# Patient Record
Sex: FEMALE | Race: WHITE | NOT HISPANIC OR LATINO | Employment: OTHER | URBAN - METROPOLITAN AREA
[De-identification: names, ages, dates, MRNs, and addresses within clinical notes are randomized per-mention and may not be internally consistent; named-entity substitution may affect disease eponyms.]

---

## 2017-11-01 ENCOUNTER — HOSPITAL ENCOUNTER (OUTPATIENT)
Facility: MEDICAL CENTER | Age: 47
End: 2017-11-01
Attending: SPECIALIST | Admitting: SPECIALIST
Payer: COMMERCIAL

## 2017-11-01 VITALS
WEIGHT: 126.98 LBS | HEIGHT: 65 IN | RESPIRATION RATE: 16 BRPM | BODY MASS INDEX: 21.16 KG/M2 | DIASTOLIC BLOOD PRESSURE: 68 MMHG | TEMPERATURE: 99 F | SYSTOLIC BLOOD PRESSURE: 123 MMHG | HEART RATE: 58 BPM | OXYGEN SATURATION: 98 %

## 2017-11-01 LAB
B-HCG FREE SERPL-ACNC: <5 MIU/ML
IHCGL IHCGL: NEGATIVE MIU/ML

## 2017-11-01 PROCEDURE — 500126 HCHG BOVIE, NEEDLE TIP: Performed by: SPECIALIST

## 2017-11-01 PROCEDURE — 160028 HCHG SURGERY MINUTES - 1ST 30 MINS LEVEL 3: Performed by: SPECIALIST

## 2017-11-01 PROCEDURE — 501838 HCHG SUTURE GENERAL: Performed by: SPECIALIST

## 2017-11-01 PROCEDURE — 700101 HCHG RX REV CODE 250

## 2017-11-01 PROCEDURE — 700102 HCHG RX REV CODE 250 W/ 637 OVERRIDE(OP)

## 2017-11-01 PROCEDURE — 160009 HCHG ANES TIME/MIN: Performed by: SPECIALIST

## 2017-11-01 PROCEDURE — 160046 HCHG PACU - 1ST 60 MINS PHASE II: Performed by: SPECIALIST

## 2017-11-01 PROCEDURE — 160036 HCHG PACU - EA ADDL 30 MINS PHASE I: Performed by: SPECIALIST

## 2017-11-01 PROCEDURE — 84702 CHORIONIC GONADOTROPIN TEST: CPT

## 2017-11-01 PROCEDURE — 500389 HCHG DRAIN, RESERVOIR SUCT JP 100CC: Performed by: SPECIALIST

## 2017-11-01 PROCEDURE — A9270 NON-COVERED ITEM OR SERVICE: HCPCS

## 2017-11-01 PROCEDURE — 160047 HCHG PACU  - EA ADDL 30 MINS PHASE II: Performed by: SPECIALIST

## 2017-11-01 PROCEDURE — 700111 HCHG RX REV CODE 636 W/ 250 OVERRIDE (IP)

## 2017-11-01 PROCEDURE — 500368 HCHG DRAIN, 7MM FLAT-FLUTED: Performed by: SPECIALIST

## 2017-11-01 PROCEDURE — 500754 HCHG JAW BRA: Performed by: SPECIALIST

## 2017-11-01 PROCEDURE — 160035 HCHG PACU - 1ST 60 MINS PHASE I: Performed by: SPECIALIST

## 2017-11-01 PROCEDURE — A6454 SELF-ADHER BAND W>=3" <5"/YD: HCPCS | Performed by: SPECIALIST

## 2017-11-01 PROCEDURE — 501445 HCHG STAPLER, SKIN DISP: Performed by: SPECIALIST

## 2017-11-01 PROCEDURE — 500440 HCHG DRESSING, STERILE ROLL (KERLIX): Performed by: SPECIALIST

## 2017-11-01 PROCEDURE — 160025 RECOVERY II MINUTES (STATS): Performed by: SPECIALIST

## 2017-11-01 PROCEDURE — 160002 HCHG RECOVERY MINUTES (STAT): Performed by: SPECIALIST

## 2017-11-01 PROCEDURE — 160039 HCHG SURGERY MINUTES - EA ADDL 1 MIN LEVEL 3: Performed by: SPECIALIST

## 2017-11-01 PROCEDURE — 500043 HCHG BAG-A-JET: Performed by: SPECIALIST

## 2017-11-01 PROCEDURE — 501480 HCHG STOCKINETTE: Performed by: SPECIALIST

## 2017-11-01 PROCEDURE — 160048 HCHG OR STATISTICAL LEVEL 1-5: Performed by: SPECIALIST

## 2017-11-01 PROCEDURE — 700105 HCHG RX REV CODE 258: Performed by: SPECIALIST

## 2017-11-01 RX ORDER — BACITRACIN ZINC 500 [USP'U]/G
OINTMENT TOPICAL
Status: DISCONTINUED | OUTPATIENT
Start: 2017-11-01 | End: 2017-11-01 | Stop reason: HOSPADM

## 2017-11-01 RX ORDER — LIDOCAINE HYDROCHLORIDE 10 MG/ML
INJECTION, SOLUTION INFILTRATION; PERINEURAL
Status: COMPLETED
Start: 2017-11-01 | End: 2017-11-01

## 2017-11-01 RX ORDER — SODIUM CHLORIDE, SODIUM LACTATE, POTASSIUM CHLORIDE, CALCIUM CHLORIDE 600; 310; 30; 20 MG/100ML; MG/100ML; MG/100ML; MG/100ML
INJECTION, SOLUTION INTRAVENOUS
Status: DISPENSED | OUTPATIENT
Start: 2017-11-01 | End: 2017-11-01

## 2017-11-01 RX ORDER — ACETAMINOPHEN 500 MG
TABLET ORAL
Status: COMPLETED
Start: 2017-11-01 | End: 2017-11-01

## 2017-11-01 RX ORDER — OXYCODONE HCL 5 MG/5 ML
SOLUTION, ORAL ORAL
Status: COMPLETED
Start: 2017-11-01 | End: 2017-11-01

## 2017-11-01 RX ORDER — LIDOCAINE HYDROCHLORIDE AND EPINEPHRINE 5; 5 MG/ML; UG/ML
INJECTION, SOLUTION INFILTRATION; PERINEURAL
Status: DISCONTINUED | OUTPATIENT
Start: 2017-11-01 | End: 2017-11-01 | Stop reason: HOSPADM

## 2017-11-01 RX ORDER — SCOLOPAMINE TRANSDERMAL SYSTEM 1 MG/1
PATCH, EXTENDED RELEASE TRANSDERMAL
Status: COMPLETED
Start: 2017-11-01 | End: 2017-11-01

## 2017-11-01 RX ORDER — CELECOXIB 200 MG/1
CAPSULE ORAL
Status: COMPLETED
Start: 2017-11-01 | End: 2017-11-01

## 2017-11-01 RX ORDER — SODIUM CHLORIDE, SODIUM LACTATE, POTASSIUM CHLORIDE, CALCIUM CHLORIDE 600; 310; 30; 20 MG/100ML; MG/100ML; MG/100ML; MG/100ML
INJECTION, SOLUTION INTRAVENOUS CONTINUOUS
Status: DISCONTINUED | OUTPATIENT
Start: 2017-11-01 | End: 2017-11-01 | Stop reason: HOSPADM

## 2017-11-01 RX ORDER — LEVOTHYROXINE SODIUM 0.12 MG/1
125 TABLET ORAL
COMMUNITY

## 2017-11-01 RX ADMIN — FENTANYL CITRATE 25 MCG: 50 INJECTION, SOLUTION INTRAMUSCULAR; INTRAVENOUS at 15:34

## 2017-11-01 RX ADMIN — LIDOCAINE HYDROCHLORIDE 0.2 ML: 10 INJECTION, SOLUTION INFILTRATION; PERINEURAL at 09:45

## 2017-11-01 RX ADMIN — OXYCODONE HYDROCHLORIDE 10 MG: 5 SOLUTION ORAL at 15:10

## 2017-11-01 RX ADMIN — FENTANYL CITRATE 50 MCG: 50 INJECTION, SOLUTION INTRAMUSCULAR; INTRAVENOUS at 15:57

## 2017-11-01 RX ADMIN — ACETAMINOPHEN 1000 MG: 500 TABLET, COATED ORAL at 10:06

## 2017-11-01 RX ADMIN — CELECOXIB 200 MG: 200 CAPSULE ORAL at 10:06

## 2017-11-01 RX ADMIN — SCOPALAMINE 1 PATCH: 1 PATCH, EXTENDED RELEASE TRANSDERMAL at 09:45

## 2017-11-01 RX ADMIN — SODIUM CHLORIDE, POTASSIUM CHLORIDE, SODIUM LACTATE AND CALCIUM CHLORIDE 1000 ML: 600; 310; 30; 20 INJECTION, SOLUTION INTRAVENOUS at 10:05

## 2017-11-01 ASSESSMENT — PAIN SCALES - GENERAL
PAINLEVEL_OUTOF10: 6
PAINLEVEL_OUTOF10: 4
PAINLEVEL_OUTOF10: 3
PAINLEVEL_OUTOF10: ASSUMED PAIN PRESENT
PAINLEVEL_OUTOF10: 4
PAINLEVEL_OUTOF10: 4
PAINLEVEL_OUTOF10: 0
PAINLEVEL_OUTOF10: 7
PAINLEVEL_OUTOF10: ASSUMED PAIN PRESENT
PAINLEVEL_OUTOF10: 5
PAINLEVEL_OUTOF10: 4
PAINLEVEL_OUTOF10: 3

## 2017-11-01 NOTE — OR NURSING
1450- Pt to pacu via gurney with side rails up.  VSS. OPA in place.  Dressing around face (ace wrap) cdi with jaw braw with ice in place.  ESTRELLA to right and left side of face to self suction with small amount serosang drainage. HOB elevated 30 degrees.  1505- VSS. Pt c/o mild pain, see med given. Ice pack over eyes. Denies nausea.  1520- VSS. Pt resting quietly.  1535-VSS.  Pt rates pain now at 6/10, see mar for med given. Pt's friend updated via phone, not able to be here to  pt until 1645.  1550- Pt c/o headache, 7/10. See mar.  1605- Pain now tolerable  1620- sanguinous drainage coming through dressing   near L ESTRELLA drain insertion site.  Reinforced with ABD pad.  1640-  Report to April RN. Pt meets stage 2 criteria.

## 2017-11-01 NOTE — DISCHARGE INSTRUCTIONS
ACTIVITY: Rest and take it easy for the first 24 hours.  A responsible adult is recommended to remain with you during that time.  It is normal to feel sleepy.  We encourage you to not do anything that requires balance, judgment or coordination.    MILD FLU-LIKE SYMPTOMS ARE NORMAL. YOU MAY EXPERIENCE GENERALIZED MUSCLE ACHES, THROAT IRRITATION, HEADACHE AND/OR SOME NAUSEA.    FOR 24 HOURS DO NOT:  Drive, operate machinery or run household appliances.  Drink beer or alcoholic beverages.   Make important decisions or sign legal documents.    SPECIAL INSTRUCTIONS:  Keep Head of bed elevated 30 degrees when resting/sleeping  Incentive spirometer at home 10x/hr while awake  Ice compresses on / off to upper cheeks x 48 hours   Jaw bra with ice x 72 hours   Ice NS compresses eyelids on/off x 48 hours   Refresh eye drops as needed while awake   Lacrilube as needed while asleep   Check and milk ESTRELLA drains every 4 hours; empty ESTRELLA drains every 12 hours or when half full  Remove scopolamine patch from back of neck after 72 hours; then immediately wash skin then hands with soap and water    DIET: To avoid nausea, slowly advance diet as tolerated, avoiding spicy or greasy foods for the first day.  Add more substantial food to your diet according to your physician's instructions. INCREASE FLUIDS AND FIBER TO AVOID CONSTIPATION.    SURGICAL DRESSING/BATHING: Do not remove dressing, keep dressing clean and dry.    FOLLOW-UP APPOINTMENT:  A follow-up appointment should be arranged with your doctor; call to schedule.    You should CALL YOUR PHYSICIAN if you develop:  Fever greater than 101 degrees F.  Pain not relieved by medication, or persistent nausea or vomiting.  Excessive bleeding (blood soaking through dressing) or unexpected drainage from the wound.  Extreme redness or swelling around the incision site, drainage of pus or foul smelling drainage.  Inability to urinate or empty your bladder within 8 hours-return to Emergency  Room   Problems with breathing or chest pain- call 911    You should call 911 if you develop problems with breathing or chest pain.  If you are unable to contact your doctor or surgical center, you should go to the nearest emergency room or urgent care center.  Physician's telephone #: Dr Rachel [phone]    If any questions arise, call your doctor.  If your doctor is not available, please feel free to call the Surgical Center at [phone].  The Center is open Monday through Friday from 7AM to 7PM.  You can also call the [facility] HOTLINE open 24 hours/day, 7 days/week and speak to a nurse at [phone], or toll free at [phone].    A registered nurse may call you a few days after your surgery to see how you are doing after your procedure.    MEDICATIONS: Resume taking daily medication.  Take prescribed pain medication with food.  If no medication is prescribed, you may take non-aspirin pain medication if needed.  PAIN MEDICATION CAN BE VERY CONSTIPATING.  Take a stool softener or laxative such as senokot, pericolace, or milk of magnesia if needed.    Prescription given for Norco.  Last pain medication given at 3:10pm oxycodone 10mg.  Start antibiotic today at 6:20pm.    If your physician has prescribed pain medication that includes Acetaminophen (Tylenol), do not take additional Acetaminophen (Tylenol) while taking the prescribed medication.    Depression / Suicide Risk    As you are discharged from this [facility] facility, it is important to learn how to keep safe from harming yourself.    Recognize the warning signs:  · Abrupt changes in personality, positive or negative- including increase in energy   · Giving away possessions  · Change in eating patterns- significant weight changes-  positive or negative  · Change in sleeping patterns- unable to sleep or sleeping all the time   · Unwillingness or inability to communicate  · Depression  · Unusual sadness, discouragement and loneliness  · Talk of  wanting to die  · Neglect of personal appearance   · Rebelliousness- reckless behavior  · Withdrawal from people/activities they love  · Confusion- inability to concentrate     If you or a loved one observes any of these behaviors or has concerns about self-harm, here's what you can do:  · Talk about it- your feelings and reasons for harming yourself  · Remove any means that you might use to hurt yourself (examples: pills, rope, extension cords, firearm)  · Get professional help from the community (Mental Health, Substance Abuse, psychological counseling)  · Do not be alone:Call your Safe Contact- someone whom you trust who will be there for you.  · Call your local CRISIS HOTLINE 474-2751 or 285-468-8417  · Call your local Children's Mobile Crisis Response Team [place] (164) 868-2418 or www.Need  · Call the toll free National Suicide Prevention Hotlines   · National Suicide Prevention Lifeline 623-512-RGJO (3110)  · National Hope Line Network 800-SUICIDE (362-7949)

## 2017-11-01 NOTE — OR SURGEON
Immediate Post OP Note    PreOp Diagnosis: Aging face and neck with platysmal bands and bilateral brow ptosis with asymmetry    PostOp Diagnosis: same    Procedure(s):  RHYTIDECTOMY- REFRESHLIFT  PLATYSMALPLASTY  BROW LIFT- ENDOSCOPIC    Surgeon(s):  Manuel Rachel M.D.    Anesthesiologist/Type of Anesthesia:  Anesthesiologist: Frank Dao M.D./General    Surgical Staff:  Circulator: Amy Conner R.N.  Scrub Person: James Desouza    Specimens:  * No specimens in log *    Estimated Blood Loss: 100 cc    Findings: See dictation    Complications: None        11/1/2017 2:31 PM Manuel Rachel

## 2017-11-01 NOTE — OR NURSING
1641 patient to stage 2  Patient settled in recliner chair post short ambulation from lay - pt dressed with assist by RN. Dressing to head CDI with moderate serosanguinous drainage to bilateral head ESTRELLA drains. Refreshed ice packs now for jaw bra. Pt reports pain as tolerable, denies nausea and tolerating sips of water.   1700 Pt sitting up in recliner; resting quietly waiting for friend to arrive for discharge. Denies any needs.   1735 Instructed to use IS x 10 every hour while awake; demonstrated understanding. IS max 2300; reaches goal. Pain rated as tolerable and denies nausea.   1745 Lolly from Dr Rachel office here and reports that she knows care of patient post op for instructions; RN reviewed pain medication and antibiotic dose times due with specific instructions on discharge instructions; she states verbal understanding.

## 2017-11-10 NOTE — OP REPORT
DATE OF SERVICE:  11/01/2017    SERVICE:  Plastic surgery.    SURGEON:  Kiet Rachel MD    ANESTHESIOLOGIST:  Frank Dao MD    ANESTHESIA:  General.    PREOPERATIVE DIAGNOSES:  Aging face and neck with platysmal bands, and   bilateral brow ptosis with asymmetry.    POSTOPERATIVE DIAGNOSES:  Aging face and neck with platysmal bands, and   bilateral brow ptosis with asymmetry.    OPERATIVE PROCEDURE:  RefreshLift with platysmaplasty and bilateral   endoscopic brow lifts.    INDICATIONS:  A 46-year-old female who has had a modified facelift in the past   by Dr. Guy as well as fat injections to lower eyelids, is unhappy with   the appearance of her face, neck and brows.  The patient has left worse than   right significant brow ptosis as well as aging face and neck with platysmal   bands and asymmetry of the face where the left oral commissure is slightly   higher than the right and the chin is tilted to the left as the right side of   the face is slightly longer.  Her left cheek is also higher than the   right.  The patient will undergo these procedures under general anesthesia as   an outpatient.  She understands risks, benefits, and alternatives including   but not limited to the risks of bleeding, hematoma, seroma, infection, wound   dehiscence, painful or unsightly scarring, hypertrophic or keloid scarring,   painful neuroma, sensory loss or decrease, injury to muscle, injury to ribs,   pneumothorax, DVT, pulmonary embolism, fat embolism, injury to facial muscles   and nerves, facial paralysis, paresis, weakness or asymmetry, injury to   parotid and other salivary glands and ducts, parotitis, sialitis, dry mouth   syndrome, infection, extrusion, palpability, visibility, loosening or rupture   of Supramid sutures, injury to eyes and eyelids, blepharitis, conjunctivitis,   upper eyelid lagophthalmos or ptosis, lower eyelid retraction or ectropion,   chemosis, exposure keratitis, corneal abrasion, retrobulbar  hematoma,   blindness, visual compromise, or blurred vision, injury to supraorbital and   supratrochlear nerves and vessels, brow ptosis, paralysis, weakness or   asymmetry, injury to the greater auricular nerves, skin, fat, and muscle   necrosis, malposition or asymmetry of the ears and ear lobes, malposition or   asymmetry of frontal, occipital, and temporal hairlines, scar alopecia, brows   that are too higher and too little, brow asymmetry, persistent or recurrent   aging face and neck with platysmal bands, injury to neck structures, airway   compromise, injury to thyroid, telangiectasias, pigmentation changes, dimpling   or irregularities, changes with weight gain or loss, changes with aging,   medications, health condition, trauma, infection, sun exposure, cardiovascular   or cardiorespiratory compromise, aspiration pneumonitis, hemorrhage, need for   transfusion, complications related to drains, complications related to   general anesthesia, complications related to sutures, staples and dressings,   mortality, and need for future revision.  Patient motivated and signed   informed consent.    OPERATIVE REPORT:  The patient was marked preoperatively in the sitting   position indicating the platysmal bands as well as the area of asymmetries and   scarring as well as incisions on the face and forehead.  Again, it is noted   that she has left worse than right brow ptosis with asymmetry as well as scars   in previous modified facelift and the left oral commissure is slightly higher   than the right as well as tilting of the chin to the left as the right side   of the face is slightly longer than the right.  Finally, the left cheek   appears slightly higher than the right.  The patient was taken to the OR where   she was prepped and draped in supine position under general anesthesia.    Sequential stockings placed.  Starting with the chin and neck area, it was   infiltrated with 0.25% Xylocaine with 1:400,000  dilution epinephrine.  A 2.5   cm submental crease incision was made excising an old scar with a 15 blade   through skin and subcutaneous tissue and then dissection was carried down to   the thyroid cartilage, inferiorly and about 8 cm to the right and 8 cm to the   left from jawline down to lower neck.  This was done  the skin and   subcutaneous fat from the underlying platysmal bands.  Hemostasis secured   using electrocautery and wound irrigated with sterile saline.  At this point,   the platysmal bands were transected at the level of the thyroid cartilage for   about 4-5 cm to the right and 4-5 cm to the left as well as in the submental   crease for couple of centimeters on each side.  Some preplatysmal and   subplatysmal fat was excised and hemostasis secured using bipolar   electrocautery.  At this point, the patient underwent a 2-layer corset   platysmaplasty using buried running 5-0 PDS suture starting at the submental   crease incision and reapproximating the platysmal bands at the midline down to   level of thyroid cartilage and then cephalad as a second pass,   reapproximating the platysmal bands in the midline and tying this suture with   multiple buried knots at the submental incision.  Hemostasis secured and then   the face was turned to the right where the left temporal scalp, upper, mid and   lower cheek, anterior lateral neck, postauricular mastoid skin were   infiltrated with 0.25% Xylocaine with 1:400,000 dilution epinephrine.  A   vertical incision was made in the temporal scalp and carried as a preauricular   and retrotragal incision behind the old scars and then around the base of the   earlobe again behind the old scar and then posteriorly and inferiorly a few   millimeters posterior to the occipital hairline.  This was done with a 15   blade through skin and subcutaneous tissue and then dissection was carried   anteriorly about intermediate the distance from the preauricular incision to  the   nasolabial crease as well as across the jawline and into the anterolateral   neck communicating with the platysmaplasty dissection.  Finally, the   postauricular and mastoid skin were elevated off of the underlying platysmal   muscle and fascia.  Hemostasis secured using bipolar electrocautery and wound   irrigated with sterile saline.  At this point, a running buried pursestring   2-0 Supramid suture was started at 1 cm anterior from the preauricular   incision and at the level of the proximal left zygomatic arch and weaved   inferiorly in the SMAS down the cheek and for a few centimeters below the   jawline and then anteriorly weaving it into the platysma for few centimeters   and then cephalad weaving it in the SMAS in the cheek and then tacking it down   to the left proximal zygomatic arch periosteum and cheek fascia where it was   tied with multiple buried knots under appropriate tension suspending the left   platysmal muscle and plicating the left cheek SMAS.  At this point, a number   of interrupted buried somewhat transverse and less cephaloposterior vector   interrupted 3-0 Supramid malar fat pad suspension and SMAS plication sutures   were placed in the cheek followed by interrupted buried 3-0 Supramid cervical   fascia to platysma sutures placed in the neck.  Between these, a number of   interrupted buried 4-0 Monocryl quilting sutures were placed.  Hemostasis   secured and then appropriate amount of left temporal scalp, preauricular and   postauricular skin were resected and then closure under appropriate tension   using interrupted buried dermal 4-0 Monocryl suture followed by surgical   staples in the left hair-bearing temporal scalp, running 6-0 Prolene suture to   close the preauricular and retrotragal incision and running 4-0 nylon to   close the postauricular incision.  Through the submental incision, a trimmed 7   mm flat fluted Jacob drain was placed and brought out through the posterior    aspect of the left postauricular incision where it was sutured in place using   interrupted 4-0 nylon drain suture.  Highly satisfactory improvement was noted   on the left side with excellent capillary refill and perfusion.  Ice cold   compresses applied to the left face and neck and the face was turned to the   left where the exact same procedure was performed on the right side with   similar marking, infiltration, dissection, 2-0 Supramid pursestring sutures   and a number of interrupted buried cephaloposterior vector 3-0 Supramid malar   fat pad suspension, SMAS plication, and cervical fascia to platysma plication   sutures were placed.  Between these, a number of interrupted 4-0 Monocryl   quilting sutures were placed.  Please note that a more cephaloposterior vector   was used since the right side of the face was an oral commissure more lower   than the left and this significantly achieve better symmetry of the   structures.  Hemostasis secured using bipolar electrocautery and then   appropriate amount of right temporal scalp, preauricular and postauricular   skin were resected and closure under appropriate tension using interrupted   buried dermal 4-0 Monocryl sutures followed by surgical staples and   hair-bearing temporal scalp, running 6-0 Prolene suture to close the   preauricular and retrotragal incisions and running 4-0 nylon to close the   postauricular incision.  Similarly, a trimmed 7 mm flat fluted Jacob drain was   placed through the submental incision and brought out through the posterior   aspect of the right postauricular incision where sutured in place using   interrupted 4-0 nylon drain suture.  Highly satisfactory symmetry, capillary   refill and perfusion were noted.  The face placed in neutral position and ice   cold compresses applied to the right side.  The submental incision was closed   using interrupted buried dermal 4-0 Monocryl sutures followed by running   subcuticular 5-0 Prolene  suture in the skin.  Benzoin and Steri-Strips   applied.  Attention was then focused on the forehead and frontal scalp area   which were infiltrated with 0.25% Xylocaine with 1:400,000 dilution   epinephrine.  A 2 cm vertically oriented midline incision was made just behind   the frontal hairline in the frontal scalp through skin and subcutaneous   tissue as well as through frontalis muscle, galea and periosteum down to the   bone.  An elevator was introduced and then a subperiosteal dissection was   carried down anteriorly to about 2.5 cm above the brows and above the nasal   radix.  This was also carried posteriorly for 2.5 cm in the frontal scalp area   and then hairline superiorly down towards 2.5 cm above the brow on each side.    The paramedian full thickness subperiosteal dissection was performed   elevating and avoiding injury to the lateral branch of the supraorbital nerves   on each side.  As noted before, the left had a lower position of the brow   than the right.  On the left side, a 3.5x1.5 cm obliquely oriented hairline   incision was made as an ellipse excising excess skin and frontal hairline   removing the skin and subcutaneous tissue and avoiding injury to the   underlying structures.  On the right side where the brow was not as low, a 3x1   cm obliquely oriented elliptical incision was made excising frontal hairline   skin and subcutaneous fat and avoiding injury to underlying structures.    Vertical dissection was then performed after identifying and protecting the   supraorbital nerves and vessels and the artery branches.  This was done   through the frontalis, galea, and then through the periosteum.  Periosteal   elevator was introduced and further subperiosteal dissection was performed.    The endoscope was introduced and the right and then the left temporal crest   ligaments were released.  At this point, with the endoscope in place,   dissection was then carried down to the nasal radix as  well as to the superior   orbital rim on each side.  Then from lateral to medial on each side,   dissection was carried laterally to the medial temporal zygomatic sentinel   vein on each side where the bilateral lateral periorbital ligaments were   identified and released on each side.  From lateral to medial, the periosteum   and galea were released along the superior orbital rim, identifying and   protecting bilateral supraorbital and supratrochlear nerves and vessels.  At   this point, the depressor supercilii as well as the bilateral    muscles were stripped to reduce their animation and to prevent depression   deformities.  This was done identifying and protecting the supratrochlear,   supraorbital nerves and vessels.  However, on the left side, there was some   bleeding from a branch of the left supraorbital artery which required   compression and limited the amount of stripping of the  muscles.    Hemostasis was also secured also secured.  Finally, attempt to transect the   motor branches to the  muscles on each side was performed and radial   myotomies of the lateral third of the orbicularis oculi muscles were   performed at the level of the lateral aspect of the brows to decrease   depression of the brows and to enhance elevation.  Hemostasis secured using   electrocautery and wound irrigated with sterile saline.  At this point, the   brows were elevated about 1.2 cm on the lower left side and about 0.8 cm on   the higher right side to achieve excellent symmetry and improvement.  The   bilateral periosteum and galea were reapproximated as a deep layer using   interrupted buried 4-0 Monocryl sutures through the paramedian incisions on   each side, avoiding injury to the supraorbital nerves and vessels.  Once a   number of these were placed on each side significantly elevating the brows   into the better symmetrical position, the skin incisions were closed by   reapproximating  the deep subcutaneous fat and deep dermis with interrupted   buried 5-0 PDS sutures and then the skin was closed using running 6-0 Prolene   sutures.  The paramedian incision was closed using interrupted buried dermal   4-0 Monocryl suture followed by surgical staples.  The patient had significant   improvement of the face, neck and brows.  There was better symmetry of the   oral commissures, cheeks and brows.  Bacitracin ointment was applied to the   pre and postauricular suture lines as well as the drain sites.  The forehead   was dressed with 4x4 gauze, secured snug but not too tightly with sterile   4-inch Coban wrap.  A 4x4 gauze was applied to the pre and postauricular   suture lines as well as the cheek, chin and neck area and secured snug but not   too tightly with sterile 4 inch Kerlix followed by 3-inch Ace wraps.  A jaw   bra with ice was also applied as well as ice cold saline compresses to the   eyelids after lubricating the eyes which were secured with Surginet gauze.    The patient had approximately 100 mL blood loss and no complications.  All   counts correct at the end of procedure.  She was awakened from anesthesia,   extubated and returned to recovery room in stable condition.       ____________________________________     MD LITZY ANDERSON / N[facility]    DD:  11/09/2017 19:32:14  DT:  11/09/2017 22:29:50    D#:  [id number]  Job#:  [id number]

## 2019-01-14 LAB
ALBUMIN SERPL-MCNC: 4.3 G/DL (ref 3.5–5.5)
ALBUMIN/GLOB SERPL: 2.2 {RATIO} (ref 1.2–2.2)
ALP SERPL-CCNC: 35 IU/L (ref 39–117)
ALT SERPL-CCNC: 14 IU/L (ref 0–32)
AST SERPL-CCNC: 27 IU/L (ref 0–40)
BILIRUB SERPL-MCNC: 0.4 MG/DL (ref 0–1.2)
BUN SERPL-MCNC: 10 MG/DL (ref 6–24)
BUN/CREAT SERPL: 11 (ref 9–23)
CALCIUM SERPL-MCNC: 9.1 MG/DL (ref 8.7–10.2)
CHLORIDE SERPL-SCNC: 106 MMOL/L (ref 96–106)
CHOLEST SERPL-MCNC: 201 MG/DL (ref 100–199)
CO2 SERPL-SCNC: 22 MMOL/L (ref 20–29)
CREAT SERPL-MCNC: 0.88 MG/DL (ref 0.57–1)
CRP SERPL HS-MCNC: 0.92 MG/L (ref 0–3)
GLOBULIN SER CALC-MCNC: 2 G/DL (ref 1.5–4.5)
GLUCOSE SERPL-MCNC: 98 MG/DL (ref 65–99)
HDL SERPL-SCNC: 45.4 UMOL/L
HDLC SERPL-MCNC: 86 MG/DL
IF AFRICAN AMERICAN  100797: 89 ML/MIN/1.73
IF NON AFRICAN AMER 100791: 77 ML/MIN/1.73
LDL SERPL QN: 21.3 NM
LDL SERPL-SCNC: 947 NMOL/L
LDL SMALL SERPL-SCNC: 240 NMOL/L
LDLC SERPL CALC-MCNC: 101 MG/DL (ref 0–99)
LP-IR SCORE SERPL: <25
LPA SERPL-SCNC: 13 NMOL/L
POTASSIUM SERPL-SCNC: 4.2 MMOL/L (ref 3.5–5.2)
PROT SERPL-MCNC: 6.3 G/DL (ref 6–8.5)
SODIUM SERPL-SCNC: 141 MMOL/L (ref 134–144)
T4 FREE SERPL-MCNC: 1.54 NG/DL (ref 0.82–1.77)
TRIGL SERPL-MCNC: 72 MG/DL (ref 0–149)
TSH SERPL DL<=0.005 MIU/L-ACNC: 0.58 UIU/ML (ref 0.45–4.5)

## 2022-01-18 ENCOUNTER — TELEPHONE (OUTPATIENT)
Dept: VASCULAR LAB | Facility: MEDICAL CENTER | Age: 52
End: 2022-01-18

## 2022-01-18 NOTE — TELEPHONE ENCOUNTER
Called and spoke with pt about all the below information. Pt agreed. Pt wants to schedule but she wants to first double check that he is in-network for her insurance. Pt to call back and schedule if she is interested. Next available initial vascular with Dr. Bloch.     ----- Message from Michael J Bloch, M.D. sent at 1/17/2022  4:49 PM PST -----  Regarding: RE: Pt was a [facility] pt  Pls be sure that she understands that I am not a pcp.   That being said, I'm happy to see her again. It's been a couple of years since I saw her at the [facility] so pls set up as an initial visit - next available should be fine. I'll need to see her before ordering any studies.     Thanks  Bloch  ----- Message -----  From: Alexi Valdez, Med Ass't  Sent: 1/17/2022   4:43 PM PST  To: Michael J Bloch, M.D.  Subject: Pt was a [facility] pt                                  Hello!     PT would like to be seen here. No referral and you are listed as PCP.     Pt also stated she has a cardiac u/s out for you with [facility] and asked for it to be resubmitted from you here if you do plan on seeing her here.     Please advise.     LP

## (undated) DEVICE — PAD LAP STERILE 18 X 18 - (5/PK 40PK/CA)

## (undated) DEVICE — GARMENT THRP GENIOPLASTY JAW - (#95 BLUE)

## (undated) DEVICE — GLOVE BIOGEL SZ 7.5 SURGICAL PF LTX - (50PR/BX 4BX/CA)

## (undated) DEVICE — DRAPE LARGE 3 QUARTER - (20/CA)

## (undated) DEVICE — BANDAGE ROLL STERILE BULKEE 4.5 IN X 4 YD (100EA/CA)

## (undated) DEVICE — CUSHION EAR E-Z WRAP NASAL CANNULA - (25/CA)

## (undated) DEVICE — BANDAGE TENSOPLAST 3 X 5 YDS - (1/EA)

## (undated) DEVICE — WATER IRRIGATION STERILE 1000ML (12EA/CA)

## (undated) DEVICE — KIT ANESTHESIA W/CIRCUIT & 3/LT BAG W/FILTER (20EA/CA)

## (undated) DEVICE — ELECTRODE DUAL RETURN W/ CORD - (50/PK)

## (undated) DEVICE — TOWELS CLOTH SURGICAL - (4/PK 20PK/CA)

## (undated) DEVICE — DRAPE SURGICAL U 77X120 - (10/CA)

## (undated) DEVICE — SENSOR SPO2 NEO LNCS ADHESIVE (20/BX) SEE USER NOTES

## (undated) DEVICE — SUTURE 4-0 ETHILON FS-2 18 (36PK/BX)"

## (undated) DEVICE — SUTURE 6-0 PROLENE P-3 - (12/BX)

## (undated) DEVICE — SUTURE 5-0 PROL PS-3 (12PK/BX)

## (undated) DEVICE — TRAY SRGPRP PVP IOD WT PRP - (20/CA)

## (undated) DEVICE — CORDS BIPOLAR COAGULATION - 12FT STERILE DISP. (10EA/BX)

## (undated) DEVICE — ADHESIVE MASTISOL - (48/BX)

## (undated) DEVICE — DRAIN J-VAC 7MM FLAT - (10EA/CA)

## (undated) DEVICE — CONTAINER SPECIMEN BAG OR - STERILE 4 OZ W/LID (100EA/CA)

## (undated) DEVICE — PACK MINOR BASIN - (2EA/CA)

## (undated) DEVICE — SYRINGE 10 ML CONTROL LL (25EA/BX 4BX/CA)

## (undated) DEVICE — NEPTUNE 4 PORT MANIFOLD - (20/PK)

## (undated) DEVICE — SPONGE XRAY 8X4 STERL. 12PL - (10EA/TY 80TY/CA)

## (undated) DEVICE — NEEDLE SPINAL NON-SAFETY 22 GA X 3 (25EA/BX)"

## (undated) DEVICE — BOVIE NEEDLE TIP INSULATD NON-SAFETY 2CM (50/PK)

## (undated) DEVICE — HUMID-VENT HEAT AND MOISTURE EXCHANGE- (50/BX)

## (undated) DEVICE — SUCTION INSTRUMENT YANKAUER BULBOUS TIP W/O VENT (50EA/CA)

## (undated) DEVICE — GLOVE BIOGEL PI INDICATOR SZ 8.0 SURGICAL PF LF -(50/BX 4BX/CA)

## (undated) DEVICE — BANDAGE ELASTIC 2 X 5 YDS - STERILE VELCRO (25/CA) LATEX

## (undated) DEVICE — TUBE CONNECTING SUCTION - CLEAR PLASTIC STERILE 72 IN (50EA/CA)

## (undated) DEVICE — SUTURE 5-0 PROLENE P-3 - (12/BX)

## (undated) DEVICE — BANDAGE ELASTIC 3 X 5 YDS - STERILE VELCRO (25/CA)LATEX

## (undated) DEVICE — SUTURE PDS 5-0 P3 18IN - (12/BX)

## (undated) DEVICE — HEAD HOLDER JUNIOR/ADULT

## (undated) DEVICE — LACTATED RINGERS INJ 1000 ML - (14EA/CA 60CA/PF)

## (undated) DEVICE — WRAP CO-FLEX 4IN X 5YD STERIL - SELF-ADHERENT (18/CA)

## (undated) DEVICE — GOWN WARMING STANDARD FLEX - (30/CA)

## (undated) DEVICE — SUTURE GENERAL

## (undated) DEVICE — CANISTER SUCTION RIGID RED 1500CC (40EA/CA)

## (undated) DEVICE — GLOVE, LITE (PAIR)

## (undated) DEVICE — ELECTRODE 850 FOAM ADHESIVE - HYDROGEL RADIOTRNSPRNT (50/PK)

## (undated) DEVICE — CLOSURE SKIN STRIP 1/2 X 4 IN - (STERI STRIP) (50/BX 4BX/CA)

## (undated) DEVICE — SUTURE 3-0 SUPRAMID - (12/BX)

## (undated) DEVICE — SUT NABSB 2-0 J-1 36IN SPRMD E - ---201G---24/BX

## (undated) DEVICE — GLOVE SZ 7.5 LF PROTEXIS (50PR/BX)

## (undated) DEVICE — STAPLER SKIN DISP - (6/BX 10BX/CA) VISISTAT

## (undated) DEVICE — SUTURE 4-0 MONOCRYL PLUS PS-2 - 27 INCH (36/BX)

## (undated) DEVICE — SODIUM CHL IRRIGATION 0.9% 1000ML (12EA/CA)

## (undated) DEVICE — BAG DECANTER (50EA/CS)

## (undated) DEVICE — BLADE SURGICAL #15 - (50/BX 3BX/CA)

## (undated) DEVICE — STOCKINET TUBULAR 3IN STERILE - 3 X 36 YDS (25/CA)

## (undated) DEVICE — PROTECTOR ULNA NERVE - (36PR/CA)

## (undated) DEVICE — KIT ROOM DECONTAMINATION

## (undated) DEVICE — MASK ANESTHESIA ADULT  - (100/CA)

## (undated) DEVICE — SPONGE GAUZESTER 4 X 4 4PLY - (128PK/CA)

## (undated) DEVICE — BAG, SPONGE COUNT 50600

## (undated) DEVICE — RESERVOIR SUCTION 100 CC - SILICONE (20EA/CA)